# Patient Record
Sex: FEMALE | Race: OTHER | HISPANIC OR LATINO | URBAN - METROPOLITAN AREA
[De-identification: names, ages, dates, MRNs, and addresses within clinical notes are randomized per-mention and may not be internally consistent; named-entity substitution may affect disease eponyms.]

---

## 2023-03-29 ENCOUNTER — OUTPATIENT (OUTPATIENT)
Dept: OUTPATIENT SERVICES | Facility: HOSPITAL | Age: 68
LOS: 1 days | End: 2023-03-29
Payer: MEDICARE

## 2023-03-29 VITALS
OXYGEN SATURATION: 100 % | DIASTOLIC BLOOD PRESSURE: 66 MMHG | WEIGHT: 188.94 LBS | SYSTOLIC BLOOD PRESSURE: 147 MMHG | RESPIRATION RATE: 20 BRPM | HEIGHT: 62 IN | TEMPERATURE: 98 F | HEART RATE: 65 BPM

## 2023-03-29 VITALS
TEMPERATURE: 98 F | HEIGHT: 62 IN | RESPIRATION RATE: 20 BRPM | HEART RATE: 65 BPM | OXYGEN SATURATION: 100 % | WEIGHT: 188.94 LBS | SYSTOLIC BLOOD PRESSURE: 147 MMHG | DIASTOLIC BLOOD PRESSURE: 66 MMHG

## 2023-03-29 DIAGNOSIS — Z90.49 ACQUIRED ABSENCE OF OTHER SPECIFIED PARTS OF DIGESTIVE TRACT: Chronic | ICD-10-CM

## 2023-03-29 DIAGNOSIS — R91.8 OTHER NONSPECIFIC ABNORMAL FINDING OF LUNG FIELD: ICD-10-CM

## 2023-03-29 LAB — GLUCOSE BLDC GLUCOMTR-MCNC: 108 MG/DL — HIGH (ref 70–99)

## 2023-03-29 PROCEDURE — 88360 TUMOR IMMUNOHISTOCHEM/MANUAL: CPT

## 2023-03-29 PROCEDURE — 88341 IMHCHEM/IMCYTCHM EA ADD ANTB: CPT

## 2023-03-29 PROCEDURE — 31625 BRONCHOSCOPY W/BIOPSY(S): CPT | Mod: RT

## 2023-03-29 PROCEDURE — 88342 IMHCHEM/IMCYTCHM 1ST ANTB: CPT

## 2023-03-29 PROCEDURE — 31652 BRONCH EBUS SAMPLNG 1/2 NODE: CPT

## 2023-03-29 PROCEDURE — 88305 TISSUE EXAM BY PATHOLOGIST: CPT

## 2023-03-29 PROCEDURE — 88341 IMHCHEM/IMCYTCHM EA ADD ANTB: CPT | Mod: 26

## 2023-03-29 PROCEDURE — 88360 TUMOR IMMUNOHISTOCHEM/MANUAL: CPT | Mod: 26

## 2023-03-29 PROCEDURE — 82962 GLUCOSE BLOOD TEST: CPT

## 2023-03-29 PROCEDURE — 88173 CYTOPATH EVAL FNA REPORT: CPT | Mod: 26

## 2023-03-29 PROCEDURE — 31624 DX BRONCHOSCOPE/LAVAGE: CPT | Mod: RT

## 2023-03-29 PROCEDURE — 88305 TISSUE EXAM BY PATHOLOGIST: CPT | Mod: 26

## 2023-03-29 PROCEDURE — 88112 CYTOPATH CELL ENHANCE TECH: CPT

## 2023-03-29 PROCEDURE — 88342 IMHCHEM/IMCYTCHM 1ST ANTB: CPT | Mod: 26

## 2023-03-29 PROCEDURE — 88172 CYTP DX EVAL FNA 1ST EA SITE: CPT

## 2023-03-29 PROCEDURE — 88112 CYTOPATH CELL ENHANCE TECH: CPT | Mod: 26,59

## 2023-03-29 PROCEDURE — 88173 CYTOPATH EVAL FNA REPORT: CPT

## 2023-03-29 PROCEDURE — C9399: CPT

## 2023-03-29 DEVICE — PACK THORACENTESIS CHG: Type: IMPLANTABLE DEVICE | Status: FUNCTIONAL

## 2023-03-29 RX ORDER — METFORMIN HYDROCHLORIDE 850 MG/1
1 TABLET ORAL
Refills: 0 | DISCHARGE

## 2023-03-29 RX ORDER — LOSARTAN/HYDROCHLOROTHIAZIDE 100MG-25MG
1 TABLET ORAL
Refills: 0 | DISCHARGE

## 2023-03-29 NOTE — ASU DISCHARGE PLAN (ADULT/PEDIATRIC) - CARE PROVIDER_API CALL
Fred Traore (DO)  Critical Care Medicine; Internal Medicine; Pulmonary Disease  [address] [place] [address], [facility] [address]  [place] [address]  Phone: [phone]  Fax: [phone]  Established Patient  Follow Up Time: 1 week

## 2023-03-29 NOTE — PACU DISCHARGE NOTE - AIRWAY PATENCY:
POST PROCEDURE CONCLUSIONS:    EUS of the Pancreas - surgically absent distal pancreas and spleen. Normal appearing gallbladder. Pancreas parenchyma appeared normal.  There were several small cystic areas within the neck of the pancreas which may be branch IPMN or side branches of the main pancreas duct. However, none measured 2.0 cm . It is possible there is a cyst outside of the range of the EUS imaging and outside of the pancreas. There was no solid lesion within the pancreas. Normal major papilla. RECOMMENDATIONS:  Follow up appt. With Eulalio Crowe in 3 months. Recommend to repeat the MRI of the Abdomen in 4 months in order to monitor this cyst outside of the pancreas neck for change of size or any solid component. If there is any increase in size, then will need to go back to [facility] for further evaluation. [address] [place] [address],  in partnership with [facility] [address].   [address] [address]  [phone]
Satisfactory

## 2023-03-29 NOTE — ASU PATIENT PROFILE, ADULT - NSICDXPASTMEDICALHX_GEN_ALL_CORE_FT
PAST MEDICAL HISTORY:  H/O: HTN (hypertension)     High cholesterol     Type 2 diabetes mellitus

## 2023-04-04 LAB — NON-GYNECOLOGICAL CYTOLOGY STUDY: SIGNIFICANT CHANGE UP

## 2023-04-05 LAB — SURGICAL PATHOLOGY STUDY: SIGNIFICANT CHANGE UP

## (undated) DEVICE — SYR LUER LOK 50CC

## (undated) DEVICE — BRUSH CYTO ENDO

## (undated) DEVICE — Device

## (undated) DEVICE — CHEST DRAIN PLEUR-EVAC WET/WET ADULT-PEDS SINGLE (QUICK)

## (undated) DEVICE — NDL ASPIRATION 21G

## (undated) DEVICE — NDL HYPO SAFE 18G X 1.5" (PINK)

## (undated) DEVICE — FORCEP RADIAL JAW 4 PEDIATRIC W NDL 1.8MM 2MM 160CM DISP

## (undated) DEVICE — FILTERLINE ET TUBE PED/ADLT ETCO2

## (undated) DEVICE — TRAP SPECIMEN SPUTUM 40CC

## (undated) DEVICE — SOL INJ NS 0.9% 500ML 1-PORT

## (undated) DEVICE — BALLOON SINGLE FOR BF-UC160F

## (undated) DEVICE — TUBING SUCTION 20FT

## (undated) DEVICE — VALVE BIOPSY BRONCHOVIDEOSCOPE

## (undated) DEVICE — DRSG CURITY GAUZE SPONGE 4 X 4" 12-PLY

## (undated) DEVICE — FOLEY HOLDER STATLOCK 2 WAY ADULT

## (undated) DEVICE — SOL INJ NS 0.9% 250ML

## (undated) DEVICE — SYR LUER LOK 3CC

## (undated) DEVICE — VALVE SUCTION EVIS 160/200/240

## (undated) DEVICE — NDL ASPIRATION 22G W SYR

## (undated) DEVICE — NDL ENDOBRONCHIAL ULTRASOUND FINE BIOPSY DEVICE 22GA

## (undated) DEVICE — GOWN TRIMAX LG

## (undated) DEVICE — ELCTR GROUNDING PAD ADULT COVIDIEN

## (undated) DEVICE — FILTERLINE NASAL O2 CO2 ADLT

## (undated) DEVICE — SUCTION CATH ARGYLE WHISTLE TIP 14FR STRAIGHT PACKED

## (undated) DEVICE — TUBING SUCTION CONN 6FT STERILE

## (undated) DEVICE — SUCTION YANKAUER NO CONTROL VENT

## (undated) DEVICE — SYR LUER LOK 10CC

## (undated) DEVICE — MASK O2 NON REBREATH 3IN1 ADULT